# Patient Record
Sex: FEMALE | Race: OTHER
[De-identification: names, ages, dates, MRNs, and addresses within clinical notes are randomized per-mention and may not be internally consistent; named-entity substitution may affect disease eponyms.]

---

## 2020-04-01 ENCOUNTER — HOSPITAL ENCOUNTER (OUTPATIENT)
Dept: HOSPITAL 72 - PAN | Age: 65
Discharge: HOME | End: 2020-04-01
Payer: MEDICAID

## 2020-04-01 VITALS — HEIGHT: 60 IN | BODY MASS INDEX: 23.95 KG/M2 | WEIGHT: 122 LBS

## 2020-04-01 VITALS — DIASTOLIC BLOOD PRESSURE: 62 MMHG | SYSTOLIC BLOOD PRESSURE: 103 MMHG

## 2020-04-01 DIAGNOSIS — R63.4: Primary | ICD-10-CM

## 2020-04-01 DIAGNOSIS — R11.0: ICD-10-CM

## 2020-04-01 NOTE — CONSULTATION
DATE OF CONSULTATION:  04/01/2020

CONSULTING PHYSICIAN:  Dionisio Sheikh M.D.



CHIEF COMPLAINT:  Weight loss.



HISTORY OF PRESENT ILLNESS:  This is a very pleasant 64-year-old female.

According to her, initially she had tried to lose weight last summer, but

then it is currently getting out of control even though now she has

stopped doing exercise, even though she does not want to lose anymore, but

she continues to lose and she worries about that.  Last colonoscopy was

five years ago, but it was not clear, the patient could not tolerate the

prep.



PAST MEDICAL HISTORY:  History of arrhythmias.



PAST SURGICAL HISTORY:  She had history of pacemaker placement.



MEDICATIONS:  Please see medication reconciliation list.



FAMILY HISTORY:  No family history of GI malignancies.



SOCIAL HISTORY:  The patient drinks alcohol on social occasions.  Denies

any tobacco or IV drug abuse.



ALLERGIES:  Barium.



REVIEW OF SYSTEMS:  Positive for weight loss and nausea.



PHYSICAL EXAMINATION:

VITAL SIGNS:  Temperature 98.2, blood pressure 103/62, pulse 70,

respirations 20.

HEENT:  Normocephalic and atraumatic.  Sclerae anicteric.

NECK:  Supple.  No evidence of obvious lymphadenopathy.

CARDIOVASCULAR:  Regular rate and rhythm.  Plus S1 and S2.

LUNGS:  Clear to auscultation bilaterally.

ABDOMEN:  Positive bowel sounds.  Soft and nontender.  No rebound.  No

guarding.  No peritoneal sign.

EXTREMITIES:  No cyanosis.  No clubbing.  No edema.



ASSESSMENT AND PLAN:  This is a 64-year-old female with weight loss,

unintentional weight loss.  Plan to do endoscopy and colonoscopy.  The

patient will need laboratory on the procedure day including CBC, CMP, CEA.

If the endoscopy and colonoscopy is nondiagnostic, the patient most

probably will benefit from pan-CT for evaluation.









  ______________________________________________

  Dionisio Sheikh M.D.





DR:  JESUS

D:  04/01/2020 13:46

T:  04/01/2020 16:31

JOB#:  [id number]

CC:

## 2020-05-11 ENCOUNTER — HOSPITAL ENCOUNTER (OUTPATIENT)
Dept: HOSPITAL 72 - PAN | Age: 65
Discharge: HOME | End: 2020-05-11
Payer: MEDICAID

## 2020-05-11 DIAGNOSIS — R63.4: Primary | ICD-10-CM

## 2020-05-11 DIAGNOSIS — B96.81: ICD-10-CM

## 2020-05-11 DIAGNOSIS — Z88.2: ICD-10-CM

## 2020-05-11 PROCEDURE — 99212 OFFICE O/P EST SF 10 MIN: CPT

## 2020-05-11 NOTE — GENERAL PROGRESS NOTE
Assessment/Plan


Assessment/Plan:


s/p EGD and colonoscopy





treat for HP and RTC in 3 months





repeat colonoscopy in 5 years





patient was recommended to ask PMD to order pan CT given sig weight loss





Subjective


ROS Limited/Unobtainable:  Yes


Allergies:  


Coded Allergies:  


     SULFAMETHOXAZOLE (Verified  Allergy, Intermediate, 4/2/20)


 flu like symptom, nausea


     TRIMETHOPRIM (Verified  Allergy, Intermediate, 4/2/20)


 flu like symptom, nausea





Objective


General Appearance:  alert


EENT:  normal ENT inspection


Neck:  supple


Cardiovascular:  normal rate


Respiratory/Chest:  decreased breath sounds


Abdomen:  normal bowel sounds, non tender, soft


Extremities:  non-tender











Dionisio Sheikh MD May 11, 2020 12:45